# Patient Record
Sex: FEMALE | Race: WHITE | Employment: FULL TIME | URBAN - METROPOLITAN AREA
[De-identification: names, ages, dates, MRNs, and addresses within clinical notes are randomized per-mention and may not be internally consistent; named-entity substitution may affect disease eponyms.]

---

## 2019-03-25 ENCOUNTER — APPOINTMENT (OUTPATIENT)
Dept: MRI IMAGING | Facility: CLINIC | Age: 49
End: 2019-03-25
Attending: EMERGENCY MEDICINE
Payer: COMMERCIAL

## 2019-03-25 ENCOUNTER — HOSPITAL ENCOUNTER (EMERGENCY)
Facility: CLINIC | Age: 49
Discharge: HOME | End: 2019-03-25
Attending: EMERGENCY MEDICINE | Admitting: EMERGENCY MEDICINE
Payer: COMMERCIAL

## 2019-03-25 VITALS
RESPIRATION RATE: 16 BRPM | DIASTOLIC BLOOD PRESSURE: 81 MMHG | OXYGEN SATURATION: 99 % | TEMPERATURE: 98.1 F | SYSTOLIC BLOOD PRESSURE: 128 MMHG | HEART RATE: 80 BPM

## 2019-03-25 DIAGNOSIS — R79.89 LOW TSH LEVEL: ICD-10-CM

## 2019-03-25 DIAGNOSIS — R90.89 ABNORMAL FINDING ON MRI OF BRAIN: ICD-10-CM

## 2019-03-25 DIAGNOSIS — R20.0 BILATERAL LEG NUMBNESS: ICD-10-CM

## 2019-03-25 LAB
ANION GAP SERPL CALCULATED.3IONS-SCNC: 4 MMOL/L (ref 3–14)
BASOPHILS # BLD AUTO: 0 10E9/L (ref 0–0.2)
BASOPHILS NFR BLD AUTO: 0.5 %
BUN SERPL-MCNC: 9 MG/DL (ref 7–30)
CALCIUM SERPL-MCNC: 8.5 MG/DL (ref 8.5–10.1)
CHLORIDE SERPL-SCNC: 106 MMOL/L (ref 94–109)
CO2 SERPL-SCNC: 29 MMOL/L (ref 20–32)
CREAT SERPL-MCNC: 0.7 MG/DL (ref 0.52–1.04)
DIFFERENTIAL METHOD BLD: NORMAL
EOSINOPHIL # BLD AUTO: 0 10E9/L (ref 0–0.7)
EOSINOPHIL NFR BLD AUTO: 0.5 %
ERYTHROCYTE [DISTWIDTH] IN BLOOD BY AUTOMATED COUNT: 12.6 % (ref 10–15)
GFR SERPL CREATININE-BSD FRML MDRD: >90 ML/MIN/{1.73_M2}
GLUCOSE SERPL-MCNC: 81 MG/DL (ref 70–99)
HCT VFR BLD AUTO: 40.3 % (ref 35–47)
HGB BLD-MCNC: 13.4 G/DL (ref 11.7–15.7)
IMM GRANULOCYTES # BLD: 0 10E9/L (ref 0–0.4)
IMM GRANULOCYTES NFR BLD: 0.1 %
LYMPHOCYTES # BLD AUTO: 2.1 10E9/L (ref 0.8–5.3)
LYMPHOCYTES NFR BLD AUTO: 27.7 %
MCH RBC QN AUTO: 31.3 PG (ref 26.5–33)
MCHC RBC AUTO-ENTMCNC: 33.3 G/DL (ref 31.5–36.5)
MCV RBC AUTO: 94 FL (ref 78–100)
MONOCYTES # BLD AUTO: 0.5 10E9/L (ref 0–1.3)
MONOCYTES NFR BLD AUTO: 7.1 %
NEUTROPHILS # BLD AUTO: 4.8 10E9/L (ref 1.6–8.3)
NEUTROPHILS NFR BLD AUTO: 64.1 %
NRBC # BLD AUTO: 0 10*3/UL
NRBC BLD AUTO-RTO: 0 /100
PLATELET # BLD AUTO: 317 10E9/L (ref 150–450)
POTASSIUM SERPL-SCNC: 3.8 MMOL/L (ref 3.4–5.3)
RBC # BLD AUTO: 4.28 10E12/L (ref 3.8–5.2)
SODIUM SERPL-SCNC: 139 MMOL/L (ref 133–144)
T4 FREE SERPL-MCNC: 1.3 NG/DL (ref 0.76–1.46)
TROPONIN I SERPL-MCNC: <0.015 UG/L (ref 0–0.04)
TSH SERPL DL<=0.005 MIU/L-ACNC: 0.11 MU/L (ref 0.4–4)
WBC # BLD AUTO: 7.5 10E9/L (ref 4–11)

## 2019-03-25 PROCEDURE — 80048 BASIC METABOLIC PNL TOTAL CA: CPT | Performed by: EMERGENCY MEDICINE

## 2019-03-25 PROCEDURE — 85025 COMPLETE CBC W/AUTO DIFF WBC: CPT | Performed by: EMERGENCY MEDICINE

## 2019-03-25 PROCEDURE — 25500064 ZZH RX 255 OP 636: Performed by: EMERGENCY MEDICINE

## 2019-03-25 PROCEDURE — 93005 ELECTROCARDIOGRAM TRACING: CPT

## 2019-03-25 PROCEDURE — 84439 ASSAY OF FREE THYROXINE: CPT | Performed by: EMERGENCY MEDICINE

## 2019-03-25 PROCEDURE — 70553 MRI BRAIN STEM W/O & W/DYE: CPT

## 2019-03-25 PROCEDURE — A9585 GADOBUTROL INJECTION: HCPCS | Performed by: EMERGENCY MEDICINE

## 2019-03-25 PROCEDURE — 99285 EMERGENCY DEPT VISIT HI MDM: CPT | Mod: 25

## 2019-03-25 PROCEDURE — 84443 ASSAY THYROID STIM HORMONE: CPT | Performed by: EMERGENCY MEDICINE

## 2019-03-25 PROCEDURE — 84484 ASSAY OF TROPONIN QUANT: CPT | Performed by: EMERGENCY MEDICINE

## 2019-03-25 RX ORDER — GADOBUTROL 604.72 MG/ML
7.5 INJECTION INTRAVENOUS ONCE
Status: COMPLETED | OUTPATIENT
Start: 2019-03-25 | End: 2019-03-25

## 2019-03-25 RX ADMIN — GADOBUTROL 7 ML: 604.72 INJECTION INTRAVENOUS at 20:43

## 2019-03-25 ASSESSMENT — ENCOUNTER SYMPTOMS
CHEST TIGHTNESS: 1
WEAKNESS: 0
EYE PAIN: 0
COUGH: 0
NUMBNESS: 1
SORE THROAT: 0
RHINORRHEA: 0
FEVER: 0
SHORTNESS OF BREATH: 0
HEADACHES: 0
SPEECH DIFFICULTY: 0

## 2019-03-25 NOTE — ED AVS SNAPSHOT
[facility] Emergency Department  [address]  [facility] [address]  Phone:  [phone]  Fax:  [phone]                                    Daniela Mendosa   MRN: [id number]    Department:  [facility] Emergency Department   Date of Visit:  3/25/2019           After Visit Summary Signature Page    I have received my discharge instructions, and my questions have been answered. I have discussed any challenges I see with this plan with the nurse or doctor.    ..........................................................................................................................................  Patient/Patient Representative Signature      ..........................................................................................................................................  Patient Representative Print Name and Relationship to Patient    ..................................................               ................................................  Date                                   Time    ..........................................................................................................................................  Reviewed by Signature/Title    ...................................................              ..............................................  Date                                               Time          22EPIC Rev 08/18

## 2019-03-25 NOTE — ED TRIAGE NOTES
Patient presents to ED with right leg numbness & slight left leg numbness for 2-3 weeks chest tightness for 1-2 weeks. ABC's intact.

## 2019-03-25 NOTE — ED PROVIDER NOTES
"  History     Chief Complaint:  Numbness in legs & Chest Tightness    HPI   Daniela Mendosa is a 48 year old female with a family history of MS (aunt) who presents with numbness in her legs and chest tightness. The patient reports that for the past couple weeks she started to notice that her right shoe kept falling of her foot for no known reason. She states this kept occurring while she was wearing wedge shoes, so she bought new shoes, but then noticed that she was also having some numbness in her right leg just above the knee and down with some tingling in her right foot. She states she has also had some similar numbness in her left leg, but to a lesser extent. She denies any weakness in her feet or difficulty controlling them. She denies any weakness or numbness in her hands or arms, numbness in her face, difficulty with speech, visual disturbances or pain in her eyes. Of note, the patient states that in the past year her vision has been getting worse causing her to not be able to wear contacts anymore and have poor night vision, but she just attributed this to aging. The patient does note that she had a brain MRI back in 2009 for headaches and was told that her headaches were due to \"cluster migraines.\" She adds that she also has had some numbness in her right hand somewhat chronically in the past, but she has always attributed this to a pinched nerve in her neck in the past.    The patient also currently notes that for the past week or so she has been having some left-sided chest tightness, but denies any chest pain or shortness of breath. She states she called her PCP to make an appointment and was instead instructed to present to the ED. The patient denies any recent fevers, cough, headache or any cold symptoms. She denies any recent changes in medications or any excessive caffeine use. She denies any chest pain or pleuritic symptoms. Of note, the patient does note that she has been under a lot of stress in " the past year due to her father passing away unexpectedly, getting a divorce and having unexpected weight loss of 65 lbs.     Allergies:  Penicillins     Medications:    Ritalin  Levothyroxine    Past Medical History:    Mild Form Narcolepsy  Hypothyroidism  Cluster Migraines    Past Surgical History:    Hysterectomy    Family History:    Neuropathy  Hypertension  Heart Disease  MS (Aunt)    Social History:  Patient presents alone.  Marital Status:  Single     Review of Systems   Constitutional: Negative for fever.   HENT: Negative for congestion, rhinorrhea and sore throat.    Eyes: Negative for pain and visual disturbance.   Respiratory: Positive for chest tightness. Negative for cough and shortness of breath.    Cardiovascular: Negative for chest pain.   Neurological: Positive for numbness. Negative for speech difficulty, weakness and headaches.   All other systems reviewed and are negative.    Physical Exam     Patient Vitals for the past 24 hrs:   BP Temp Temp src Pulse Heart Rate Resp SpO2   03/25/19 2130 127/65 -- -- 83 -- -- 100 %   03/25/19 2100 137/79 -- -- 88 -- -- --   03/25/19 2015 132/84 -- -- 83 82 16 100 %   03/25/19 2000 (!) 135/95 -- -- 85 81 11 100 %   03/25/19 1945 135/90 -- -- 82 79 19 100 %   03/25/19 1930 (!) 133/91 -- -- 84 80 15 100 %   03/25/19 1915 132/81 -- -- 82 76 15 100 %   03/25/19 1900 131/89 -- -- -- 80 14 100 %   03/25/19 1800 (!) 145/93 98.1  F (36.7  C) Oral 96 -- 16 100 %     Physical Exam  General: Well appearing, nontoxic. Resting comfortably  Head:  Scalp, face, and head appear normal  Eyes:  Pupils are equal, round, and reactive to light, EOMI, no nystagmus     Conjunctivae non-injected and sclerae white  ENT:    The external nose is normal    Pinnae are normal    The oropharynx is normal, mucous membranes moist    Uvula is in the midline  Neck:  Normal range of motion    There is no rigidity noted    Trachea is in the midline  CV:  Regular rate and rhythm     Normal S1/S2,  no S3/S4    No murmur or rub. DP pulses 2+ and intact bilaterally.   Resp:  Lungs are clear and equal bilaterally    There is no tachypnea    No increased work of breathing    No rales, wheezing, or rhonchi  GI:  Abdomen is soft, no rigidity or guarding    No distension, or mass    No tenderness or rebound tenderness   MS:  Normal muscular tone    Symmetric motor strength    No lower extremity edema  Skin:  No rash or acute skin lesions noted  Neuro: A&Ox3, GCS 15    CN II - XII intact    Speech clear, fluent, and normal    Strength 5/5 and symmetric in bilateral upper and lower extremities.    No pronator drift. Patient reports subjectively decreased sensation to light touch over the lateral > medial right lower leg below the knee as well as medially on the left lower leg. The feet are spared. She also notes decreased sensation to the right hand and fingertips in all peripheral distributions.     Patellar reflexes 2+ and symmetric, no ankle clonus    FTN testing normal. No tremor.     Gait normal    No meningismus   Psych:  Normal affect.  Appropriate interactions.    Emergency Department Course     ECG (17:48:41):  Rate 91 bpm. ND interval 172 ms. QRS duration 92 ms. QT/QTc 384/472 ms. P-R-T axes 78 46 53.   Normal sinus rhythm.  Normal ECG.  Interpreted at 1827 by Kobe May MD.    Imaging:  Radiographic findings were communicated with the patient who voiced understanding of the findings.    MR Brain w/o & w Contrast  Approximately 7-8 white matter T2 hyperintensities which  are predominantly subcortical in location. These could represent  sequela of chronic small vessel ischemic change or demyelination.  As read by radiology.    Laboratory:    1806: Troponin I: <0.015    TSH with free T4 reflex: 0.11 (L)    T4 free: 1.30    BMP: AWNL (Creatinine 0.70)    CBC: AWNL (WBC 7.5, HGB 13.4, )    Interventions:    2043: Gadavist 7 mL IV    Emergency Department Course:  Past medical records, nursing notes,  and vitals reviewed.  1828: I performed an exam of the patient and obtained history, as documented above.    Blood was drawn.    The patient was sent for a brain MR while in the emergency department, findings above.    2158: I rechecked the patient. Findings and plan explained to the Patient. Patient discharged home with instructions regarding supportive care, medications, and reasons to return. The importance of close follow-up was reviewed.     Impression & Plan      Medical Decision Making:  Daniela Mendosa is a 48 year old female with past medical history as noted above who presents for evaluation of subacute bilateral lower extremity numbness and paresthesias, as well as numbness and paresthesias which are more chronic to the right hand and some subtle changes to her vision, as well as vague chest tightness without any chest pain, pressure or heaviness. The patient's symptoms are non-exertional. On my evaluation, she has some subjective numbness and sensory changes most pronounced over the lateral aspect of the right lower extremity below the knee, but also present on the left lower extremity and right hand as well.There is no definite muscle weakness and patient has normal patellar reflexes. She has no other focal neurologic deficits; no history of trauma. A broad differential diagnosis is considered including stroke, TIA, demyelinating disease including multiple sclerosis, intracranial mass or tumor, intracranial hemorrhage or other bleeding, infectious or metabolic derangements. Patient is afebrile and has no other infectious signs or symptoms and I feel that this is clinically very unlikely. No sign for meningitis or encephalitis. She has no significant headache that would point towards a complex migraine syndrome. The above work up in the Emergency Department was obtained, notable for a low TSH. Patient is on levothyroxine. Her levothyroxine dose may need to be adjusted. Free T4 was otherwise normal.  Troponin was negative. EKG unremarkable. CBC and BMP are normal. MRI of the brain was obtained which does reveal 7-8 white matter T2 hyperintensities which are predominately subcortical possibly representing chronic small vessel ischemic change or demyelination. Certainly multiple sclerosis or other demyelinating condition remains on the differential. Patient seems to be somewhat young for significant chronic small vessel ischemic change, especially given the lack of other typical diagnoses such as hypertension, hyperlipidemia, or diabetes. I feel that the patient requires very close outpatient Neurology follow up. I filled out a [facility] Neurology referral form which will be faxed to the clinic and the patient is instructed to follow up within 1 week for further neurological evaluation and treatment. At this time there is no indication for an acute or subacute stroke and no other indication for hospital admission. The patient was agreeable to plan of care and she was discharged in stable condition. Close return precautions were provided. Recommended close PCP follow up regarding TSH levels.     Diagnosis:    ICD-10-CM    1. Bilateral leg numbness R20.0    2. Abnormal finding on MRI of brain R90.89    3. Low TSH level R79.89        Disposition:  Discharged to home.    Discharge Medications:     Medication List      There are no discharge medications for this visit.           Anjali Sutton  3/25/2019   [facility] EMERGENCY DEPARTMENT  I, Anjali Sutton, am serving as a scribe at 6:28 PM on 3/25/2019 to document services personally performed by Kobe May MD based on my observations and the provider's statements to me.        Kobe May MD  03/26/19 0655

## 2019-03-26 LAB — INTERPRETATION ECG - MUSE: NORMAL

## 2019-03-26 NOTE — DISCHARGE INSTRUCTIONS
YOUR THYROID STIMULATING HORMONE LEVEL WAS LOW, WHICH MAY MEAN YOUR MEDICATION LEVEL NEEDS TO BE ADJUSTED. TALK TO YOUR PRIMARY CARE PHYSICIAN ABOUT THIS.

## 2019-07-03 ENCOUNTER — HOSPITAL ENCOUNTER (OUTPATIENT)
Dept: GENERAL RADIOLOGY | Facility: CLINIC | Age: 49
Discharge: HOME | End: 2019-07-03
Attending: PSYCHIATRY & NEUROLOGY | Admitting: PSYCHIATRY & NEUROLOGY
Payer: COMMERCIAL

## 2019-07-03 VITALS
DIASTOLIC BLOOD PRESSURE: 99 MMHG | OXYGEN SATURATION: 97 % | RESPIRATION RATE: 16 BRPM | SYSTOLIC BLOOD PRESSURE: 149 MMHG | HEART RATE: 76 BPM

## 2019-07-03 DIAGNOSIS — R20.0 NUMBNESS: ICD-10-CM

## 2019-07-03 DIAGNOSIS — M48.05 SPINAL STENOSIS OF THORACOLUMBAR REGION: ICD-10-CM

## 2019-07-03 LAB
APPEARANCE CSF: CLEAR
COLOR CSF: COLORLESS
CRYPTOC AG SPEC QL: NORMAL
GLUCOSE CSF-MCNC: 61 MG/DL (ref 40–70)
GLUCOSE SERPL-MCNC: 73 MG/DL (ref 70–99)
GRAM STN SPEC: NORMAL
PROT CSF-MCNC: 50 MG/DL (ref 15–60)
RBC # CSF MANUAL: 2 /UL (ref 0–2)
SPECIMEN SOURCE: NORMAL
SPECIMEN SOURCE: NORMAL
TUBE # CSF: 4 #
WBC # CSF MANUAL: 2 /UL (ref 0–5)

## 2019-07-03 PROCEDURE — 83916 OLIGOCLONAL BANDS: CPT | Performed by: PSYCHIATRY & NEUROLOGY

## 2019-07-03 PROCEDURE — 88108 CYTOPATH CONCENTRATE TECH: CPT | Mod: 26 | Performed by: PSYCHIATRY & NEUROLOGY

## 2019-07-03 PROCEDURE — 87899 AGENT NOS ASSAY W/OPTIC: CPT | Performed by: PSYCHIATRY & NEUROLOGY

## 2019-07-03 PROCEDURE — 00000102 ZZHCL STATISTIC CYTO WRIGHT STAIN TC: Performed by: PSYCHIATRY & NEUROLOGY

## 2019-07-03 PROCEDURE — 88108 CYTOPATH CONCENTRATE TECH: CPT | Performed by: PSYCHIATRY & NEUROLOGY

## 2019-07-03 PROCEDURE — 00000402 ZZHCL STATISTIC TOTAL PROTEIN: Performed by: PSYCHIATRY & NEUROLOGY

## 2019-07-03 PROCEDURE — 82784 ASSAY IGA/IGD/IGG/IGM EACH: CPT | Performed by: PSYCHIATRY & NEUROLOGY

## 2019-07-03 PROCEDURE — 86617 LYME DISEASE ANTIBODY: CPT | Performed by: PSYCHIATRY & NEUROLOGY

## 2019-07-03 PROCEDURE — 82947 ASSAY GLUCOSE BLOOD QUANT: CPT | Performed by: PSYCHIATRY & NEUROLOGY

## 2019-07-03 PROCEDURE — 84165 PROTEIN E-PHORESIS SERUM: CPT | Performed by: PSYCHIATRY & NEUROLOGY

## 2019-07-03 PROCEDURE — 82945 GLUCOSE OTHER FLUID: CPT | Performed by: PSYCHIATRY & NEUROLOGY

## 2019-07-03 PROCEDURE — 89050 BODY FLUID CELL COUNT: CPT | Performed by: PSYCHIATRY & NEUROLOGY

## 2019-07-03 PROCEDURE — 86617 LYME DISEASE ANTIBODY: CPT | Mod: 91 | Performed by: PSYCHIATRY & NEUROLOGY

## 2019-07-03 PROCEDURE — 84157 ASSAY OF PROTEIN OTHER: CPT | Performed by: PSYCHIATRY & NEUROLOGY

## 2019-07-03 PROCEDURE — 82042 OTHER SOURCE ALBUMIN QUAN EA: CPT | Performed by: PSYCHIATRY & NEUROLOGY

## 2019-07-03 PROCEDURE — 62270 DX LMBR SPI PNXR: CPT

## 2019-07-03 PROCEDURE — 87205 SMEAR GRAM STAIN: CPT | Performed by: PSYCHIATRY & NEUROLOGY

## 2019-07-03 PROCEDURE — 87015 SPECIMEN INFECT AGNT CONCNTJ: CPT | Performed by: PSYCHIATRY & NEUROLOGY

## 2019-07-03 PROCEDURE — 83873 ASSAY OF CSF PROTEIN: CPT | Performed by: PSYCHIATRY & NEUROLOGY

## 2019-07-03 PROCEDURE — 82040 ASSAY OF SERUM ALBUMIN: CPT | Performed by: PSYCHIATRY & NEUROLOGY

## 2019-07-03 PROCEDURE — 87070 CULTURE OTHR SPECIMN AEROBIC: CPT | Performed by: PSYCHIATRY & NEUROLOGY

## 2019-07-03 RX ORDER — DEXTROSE MONOHYDRATE 25 G/50ML
25-50 INJECTION, SOLUTION INTRAVENOUS
Status: CANCELLED | OUTPATIENT
Start: 2019-07-03

## 2019-07-03 RX ORDER — NICOTINE POLACRILEX 4 MG
15-30 LOZENGE BUCCAL
Status: CANCELLED | OUTPATIENT
Start: 2019-07-03

## 2019-07-03 NOTE — PROGRESS NOTES
RADIOLOGY PROCEDURE NOTE  Patient name: Daniela Mendosa  MRN: [id number]  : 1970    Pre-procedure diagnosis: Numbness  Post-procedure diagnosis: Same    Procedure Date/Time: July 3, 2019  3:42 PM  Procedure: LP at L2-L3  Estimated blood loss: None  Specimen(s) collected with description: ~12 mL of clear CSF  The patient tolerated the procedure well with no immediate complications.  Significant findings:none    See imaging dictation for procedural details.    Provider name: Evans Peñaloza  Assistant(s):None

## 2019-07-05 LAB
ALB CSF/SERPL: 3.9 RATIO (ref 0–9)
ALBUMIN CSF-MCNC: 18 MG/DL (ref 0–35)
ALBUMIN SERPL ELPH-MCNC: 4.4 G/DL (ref 3.7–5.1)
ALBUMIN SERPL-MCNC: 4660 MG/DL (ref 3500–5200)
ALPHA1 GLOB SERPL ELPH-MCNC: 0.3 G/DL (ref 0.2–0.4)
ALPHA2 GLOB SERPL ELPH-MCNC: 0.7 G/DL (ref 0.5–0.9)
B BURGDOR IGG CSF QL IB: NEGATIVE
B BURGDOR IGM CSF QL IB: NEGATIVE
B-GLOBULIN SERPL ELPH-MCNC: 0.8 G/DL (ref 0.6–1)
COPATH REPORT: NORMAL
GAMMA GLOB SERPL ELPH-MCNC: 0.9 G/DL (ref 0.7–1.6)
IGG CSF-MCNC: 1.9 MG/DL (ref 0–6)
IGG SERPL-MCNC: 1010 MG/DL (ref 768–1632)
IGG SYNTH RATE SER+CSF CALC-MRATE: <0 MG/D
IGG/ALB CLEAR SER+CSF-RTO: 0.49 RATIO (ref 0.28–0.66)
IGG/ALB CSF: 0.11 RATIO (ref 0.09–0.25)
M PROTEIN SERPL ELPH-MCNC: 0 G/DL
MBP CSF-MCNC: 1.86 NG/ML (ref 0–5.5)
OLIGOCLONAL BANDS CSF ELPH-IMP: NEGATIVE
OLIGOCLONAL BANDS CSF ELPH-IMP: NORMAL
OLIGOCLONAL BANDS CSF IEF: 1 BANDS (ref 0–1)
PROT PATTERN SERPL ELPH-IMP: NORMAL

## 2019-07-08 LAB
BACTERIA SPEC CULT: NO GROWTH
SPECIMEN SOURCE: NORMAL